# Patient Record
Sex: MALE | Race: WHITE
[De-identification: names, ages, dates, MRNs, and addresses within clinical notes are randomized per-mention and may not be internally consistent; named-entity substitution may affect disease eponyms.]

---

## 2023-07-17 ENCOUNTER — APPOINTMENT (OUTPATIENT)
Dept: ORTHOPEDIC SURGERY | Facility: CLINIC | Age: 33
End: 2023-07-17
Payer: COMMERCIAL

## 2023-07-17 VITALS — BODY MASS INDEX: 24.91 KG/M2 | HEIGHT: 66 IN | WEIGHT: 155 LBS

## 2023-07-17 DIAGNOSIS — Z78.9 OTHER SPECIFIED HEALTH STATUS: ICD-10-CM

## 2023-07-17 PROBLEM — Z00.00 ENCOUNTER FOR PREVENTIVE HEALTH EXAMINATION: Status: ACTIVE | Noted: 2023-07-17

## 2023-07-17 PROCEDURE — 20611 DRAIN/INJ JOINT/BURSA W/US: CPT | Mod: LT

## 2023-07-17 PROCEDURE — 73010 X-RAY EXAM OF SHOULDER BLADE: CPT | Mod: LT

## 2023-07-17 PROCEDURE — 73030 X-RAY EXAM OF SHOULDER: CPT | Mod: LT

## 2023-07-17 PROCEDURE — J3490M: CUSTOM | Mod: LT

## 2023-07-17 PROCEDURE — 99204 OFFICE O/P NEW MOD 45 MIN: CPT | Mod: 25

## 2023-07-17 NOTE — HISTORY OF PRESENT ILLNESS
[de-identified] : 33 year old male  (RHD,   ) chronic left pain acutley worse since 07/16/23 when slept wrong.\par The pain is located  anterior, lateral  and in trap\par The pain is associated with clicking and cracking. \par Worse with activity and better at rest.\par Has tried bio freeze, advil

## 2023-07-17 NOTE — IMAGING
[de-identified] : \par LEFT SHOULDER\par Inspection: No swelling. scap protracion\par Palpation: Tenderness is noted at the bicipital groove, anterior and lateral. trap ttp\par Range of motion: There is pain with range of motion.\par , ER 55, @90ER 90, @90IR 30\par Strength: There is pain and discomfort with strength testing.\par Forward Flexion 4/5. Abduction 4/5.  External Rotation 5-/5 and Internal Rotation 5/5 \par Neurological testings: motor and sensor intact distally.\par Ligament Stability and Special Tests: \par There is positive arc of pain. \par Shoulder apprehension: neg\par Shoulder relocation: neg\par Martin’s test: pos\par Biceps Active test: neg\par Roe Labral Shear: neg\par Impingement testing: pos\par Tatyana testing: pos\par Whipple: pos\par Cross Body Adduction: neg\par \par

## 2023-07-20 ENCOUNTER — NON-APPOINTMENT (OUTPATIENT)
Age: 33
End: 2023-07-20

## 2023-07-20 ENCOUNTER — APPOINTMENT (OUTPATIENT)
Dept: ORTHOPEDIC SURGERY | Facility: CLINIC | Age: 33
End: 2023-07-20
Payer: COMMERCIAL

## 2023-07-20 DIAGNOSIS — M54.12 RADICULOPATHY, CERVICAL REGION: ICD-10-CM

## 2023-07-20 DIAGNOSIS — M62.838 OTHER MUSCLE SPASM: ICD-10-CM

## 2023-07-20 DIAGNOSIS — M75.42 IMPINGEMENT SYNDROME OF LEFT SHOULDER: ICD-10-CM

## 2023-07-20 PROCEDURE — 99214 OFFICE O/P EST MOD 30 MIN: CPT

## 2023-07-20 RX ORDER — CYCLOBENZAPRINE HYDROCHLORIDE 10 MG/1
10 TABLET, FILM COATED ORAL
Qty: 30 | Refills: 0 | Status: ACTIVE | COMMUNITY
Start: 2023-07-20 | End: 1900-01-01

## 2023-07-20 NOTE — ASSESSMENT
[FreeTextEntry1] : \par \par - We discussed their diagnosis and treatment options at length including the risks and benefits of both surgical and non-surgical options.\par - We will continue conservative treatment with a course of PT, icing, and anti-inflammatory medication.\par - The patient was provided with a prescription to work on scapular strengthening and rotator cuff strengthening.\par - The patient was advised to let pain guide the gradual advancement of activities. \par - flexirl rx\par - Follow up as needed in 6 weeks to re-evaluate progress with therapy if not better consider mri \par

## 2023-07-20 NOTE — IMAGING
[de-identified] : NECK:\par Inspection: no ecchymosis. \par Palpation: trapezial tenderness. \par Range of motion:  Full range of motion with mild stiffness . Pain at extremes of rotation to left. \par Strength Testing: motor exam is non-focal throughout both lower extremities\par Normal Deltoid, Biceps, Triceps, Wrist Flexors, Finger Abductors, Grasp \par Neurological testing: light touch is intact throughout both upper extremities\par Gamez reflex: neg\par Spurling test: neg\par \par \par \par LEFT SHOULDER\par Inspection: No swelling. scap protracion\par Palpation: Tenderness is noted at the bicipital groove, anterior and lateral. trap ttp\par Range of motion: There is pain with range of motion.\par , ER 55, @90ER 90, @90IR 30\par Strength: There is pain and discomfort with strength testing.\par Forward Flexion 4/5. Abduction 4/5.  External Rotation 5-/5 and Internal Rotation 5/5 \par Neurological testings: motor and sensor intact distally.\par Ligament Stability and Special Tests: \par There is positive arc of pain. \par Shoulder apprehension: neg\par Shoulder relocation: neg\par Martin’s test: pos\par Biceps Active test: neg\par Roe Labral Shear: neg\par Impingement testing: pos\par Tatyana testing: pos\par Whipple: pos\par Cross Body Adduction: neg\par \par

## 2023-07-20 NOTE — HISTORY OF PRESENT ILLNESS
[de-identified] : 33 year old male  (RHD,   ) chronic left pain acutely worse since 07/16/23 when slept wrong.\par The pain is located  anterior, lateral  and in trap\par The pain is associated with clicking and cracking. \par Worse with activity and better at rest.\par Has tried bio freeze, advil \par \par 7/20/23- had CSI ( 7/17)  without much relief, still pain mostly in trap and side of neck along with some shoulder pain\par

## 2023-08-24 ENCOUNTER — APPOINTMENT (OUTPATIENT)
Dept: ORTHOPEDIC SURGERY | Facility: CLINIC | Age: 33
End: 2023-08-24